# Patient Record
Sex: MALE | Race: WHITE
[De-identification: names, ages, dates, MRNs, and addresses within clinical notes are randomized per-mention and may not be internally consistent; named-entity substitution may affect disease eponyms.]

---

## 2009-02-16 VITALS — SYSTOLIC BLOOD PRESSURE: 182.9 MMHG

## 2022-06-21 ENCOUNTER — HOSPITAL ENCOUNTER (INPATIENT)
Dept: HOSPITAL 19 - IMCU | Age: 72
LOS: 9 days | Discharge: HOME HEALTH | DRG: 207 | End: 2022-06-30
Attending: INTERNAL MEDICINE | Admitting: INTERNAL MEDICINE
Payer: MEDICARE

## 2022-06-21 VITALS — OXYGEN SATURATION: 97 %

## 2022-06-21 VITALS — OXYGEN SATURATION: 96 %

## 2022-06-21 VITALS — OXYGEN SATURATION: 98 %

## 2022-06-21 VITALS — OXYGEN SATURATION: 94 %

## 2022-06-21 VITALS — OXYGEN SATURATION: 99 %

## 2022-06-21 VITALS — WEIGHT: 184.75 LBS | HEIGHT: 72.01 IN | BODY MASS INDEX: 25.02 KG/M2

## 2022-06-21 VITALS — OXYGEN SATURATION: 93 %

## 2022-06-21 VITALS — OXYGEN SATURATION: 95 %

## 2022-06-21 DIAGNOSIS — R13.10: ICD-10-CM

## 2022-06-21 DIAGNOSIS — G20: ICD-10-CM

## 2022-06-21 DIAGNOSIS — F02.80: ICD-10-CM

## 2022-06-21 DIAGNOSIS — J96.21: Primary | ICD-10-CM

## 2022-06-21 DIAGNOSIS — F17.210: ICD-10-CM

## 2022-06-21 DIAGNOSIS — G89.29: ICD-10-CM

## 2022-06-21 DIAGNOSIS — R00.0: ICD-10-CM

## 2022-06-21 DIAGNOSIS — J18.9: ICD-10-CM

## 2022-06-21 DIAGNOSIS — I10: ICD-10-CM

## 2022-06-21 DIAGNOSIS — G93.40: ICD-10-CM

## 2022-06-21 DIAGNOSIS — J96.22: ICD-10-CM

## 2022-06-21 DIAGNOSIS — K21.9: ICD-10-CM

## 2022-06-21 DIAGNOSIS — J90: ICD-10-CM

## 2022-06-21 DIAGNOSIS — Z99.81: ICD-10-CM

## 2022-06-21 DIAGNOSIS — J44.9: ICD-10-CM

## 2022-06-21 DIAGNOSIS — R53.81: ICD-10-CM

## 2022-06-21 DIAGNOSIS — Z79.891: ICD-10-CM

## 2022-06-21 DIAGNOSIS — K44.9: ICD-10-CM

## 2022-06-21 DIAGNOSIS — Z88.6: ICD-10-CM

## 2022-06-21 DIAGNOSIS — E87.2: ICD-10-CM

## 2022-06-21 DIAGNOSIS — Z98.1: ICD-10-CM

## 2022-06-21 DIAGNOSIS — I08.1: ICD-10-CM

## 2022-06-21 LAB
BASE EXCESS BLDA CALC-SCNC: 2.2 MMOL/L (ref -2–2)
CO2 BLDA-SCNC: 31 MMOL/L
HCO3 BLDA-SCNC: 29.2 MEQ/L (ref 22–26)
INHALED O2 CONCENTRATION: 40 %
PCO2 BLDA: 57.7 MMHG (ref 35–45)
PO2 BLDA: 80 MMHG (ref 80–100)
SAO2 % BLDA: 95.6 % (ref 92–100)

## 2022-06-21 PROCEDURE — 02HV33Z INSERTION OF INFUSION DEVICE INTO SUPERIOR VENA CAVA, PERCUTANEOUS APPROACH: ICD-10-PCS | Performed by: EMERGENCY MEDICINE

## 2022-06-21 PROCEDURE — C1751 CATH, INF, PER/CENT/MIDLINE: HCPCS

## 2022-06-21 PROCEDURE — 5A1955Z RESPIRATORY VENTILATION, GREATER THAN 96 CONSECUTIVE HOURS: ICD-10-PCS | Performed by: RADIOLOGY

## 2022-06-22 VITALS — OXYGEN SATURATION: 90 %

## 2022-06-22 VITALS — OXYGEN SATURATION: 93 %

## 2022-06-22 VITALS — OXYGEN SATURATION: 90 % | SYSTOLIC BLOOD PRESSURE: 98 MMHG | DIASTOLIC BLOOD PRESSURE: 59 MMHG

## 2022-06-22 VITALS — OXYGEN SATURATION: 97 %

## 2022-06-22 VITALS — OXYGEN SATURATION: 96 %

## 2022-06-22 VITALS — OXYGEN SATURATION: 94 %

## 2022-06-22 VITALS — DIASTOLIC BLOOD PRESSURE: 71 MMHG | OXYGEN SATURATION: 96 % | SYSTOLIC BLOOD PRESSURE: 121 MMHG

## 2022-06-22 VITALS — OXYGEN SATURATION: 89 %

## 2022-06-22 VITALS — OXYGEN SATURATION: 92 %

## 2022-06-22 VITALS — OXYGEN SATURATION: 95 %

## 2022-06-22 VITALS — OXYGEN SATURATION: 91 %

## 2022-06-22 VITALS
OXYGEN SATURATION: 95 % | DIASTOLIC BLOOD PRESSURE: 73 MMHG | SYSTOLIC BLOOD PRESSURE: 117 MMHG | TEMPERATURE: 96.4 F | HEART RATE: 58 BPM

## 2022-06-22 VITALS — OXYGEN SATURATION: 98 %

## 2022-06-22 VITALS — SYSTOLIC BLOOD PRESSURE: 126 MMHG | OXYGEN SATURATION: 95 % | DIASTOLIC BLOOD PRESSURE: 72 MMHG

## 2022-06-22 VITALS
HEART RATE: 91 BPM | OXYGEN SATURATION: 91 % | SYSTOLIC BLOOD PRESSURE: 93 MMHG | DIASTOLIC BLOOD PRESSURE: 60 MMHG | TEMPERATURE: 99.1 F

## 2022-06-22 VITALS
TEMPERATURE: 99.1 F | HEART RATE: 70 BPM | DIASTOLIC BLOOD PRESSURE: 61 MMHG | SYSTOLIC BLOOD PRESSURE: 105 MMHG | OXYGEN SATURATION: 92 %

## 2022-06-22 VITALS — OXYGEN SATURATION: 99 %

## 2022-06-22 VITALS — OXYGEN SATURATION: 96 % | HEART RATE: 63 BPM | SYSTOLIC BLOOD PRESSURE: 122 MMHG | DIASTOLIC BLOOD PRESSURE: 68 MMHG

## 2022-06-22 VITALS — OXYGEN SATURATION: 87 %

## 2022-06-22 VITALS — HEART RATE: 65 BPM | TEMPERATURE: 97.3 F | SYSTOLIC BLOOD PRESSURE: 146 MMHG | DIASTOLIC BLOOD PRESSURE: 86 MMHG

## 2022-06-22 VITALS — SYSTOLIC BLOOD PRESSURE: 125 MMHG | DIASTOLIC BLOOD PRESSURE: 74 MMHG | OXYGEN SATURATION: 97 %

## 2022-06-22 VITALS — OXYGEN SATURATION: 88 %

## 2022-06-22 VITALS
TEMPERATURE: 98.7 F | SYSTOLIC BLOOD PRESSURE: 96 MMHG | DIASTOLIC BLOOD PRESSURE: 57 MMHG | OXYGEN SATURATION: 95 % | HEART RATE: 75 BPM

## 2022-06-22 VITALS — DIASTOLIC BLOOD PRESSURE: 51 MMHG | SYSTOLIC BLOOD PRESSURE: 82 MMHG | OXYGEN SATURATION: 91 %

## 2022-06-22 VITALS — OXYGEN SATURATION: 85 %

## 2022-06-22 LAB
ALBUMIN SERPL-MCNC: 2.4 GM/DL (ref 3.4–4.8)
ALP SERPL-CCNC: 66 U/L (ref 40–150)
ALT SERPL-CCNC: 6 U/L (ref 0–55)
ANION GAP SERPL CALC-SCNC: 10 MMOL/L (ref 7–16)
APPEARANCE PLR: (no result)
AST SERPL-CCNC: 12 U/L (ref 5–34)
BASE EXCESS BLDA CALC-SCNC: 2.9 MMOL/L (ref -2–2)
BASE EXCESS BLDA CALC-SCNC: 5 MMOL/L (ref -2–2)
BASOPHILS # BLD: 0.1 K/MM3 (ref 0–0.2)
BASOPHILS NFR BLD AUTO: 0.7 % (ref 0–2)
BILIRUB SERPL-MCNC: 0.6 MG/DL (ref 0.2–1.2)
BUN SERPL-MCNC: 15 MG/DL (ref 8–26)
CALCIUM SERPL-MCNC: 8.1 MG/DL (ref 8.4–10.2)
CHLORIDE SERPL-SCNC: 102 MMOL/L (ref 98–107)
CO2 BLDA-SCNC: 29.7 MMOL/L
CO2 BLDA-SCNC: 29.8 MMOL/L
CO2 SERPL-SCNC: 30 MMOL/L (ref 23–31)
COLOR PLR: YELLOW
CREAT SERPL-SCNC: 0.96 MG/DL (ref 0.72–1.25)
EOSINOPHIL # BLD: 0.3 K/MM3 (ref 0–0.7)
EOSINOPHIL NFR BLD: 3.9 % (ref 0–4)
ERYTHROCYTE [DISTWIDTH] IN BLOOD BY AUTOMATED COUNT: 20.2 % (ref 11.5–14.5)
GLUCOSE SERPL-MCNC: 96 MG/DL (ref 70–99)
GRANULOCYTES # BLD AUTO: 71.5 % (ref 42.2–75.2)
HCO3 BLDA-SCNC: 28.4 MEQ/L (ref 22–26)
HCO3 BLDA-SCNC: 28.6 MEQ/L (ref 22–26)
HCT VFR BLD AUTO: 34.3 % (ref 42–52)
HGB BLD-MCNC: 9.8 G/DL (ref 13.5–18)
INHALED O2 CONCENTRATION: 40 %
INHALED O2 CONCENTRATION: 40 %
LYMPHOCYTES # BLD: 1 K/MM3 (ref 1.2–3.4)
LYMPHOCYTES NFR BLD: 13.5 % (ref 20–51)
MAGNESIUM SERPL-MCNC: 1.7 MG/DL (ref 1.6–2.6)
MCH RBC QN AUTO: 22 PG (ref 27–31)
MCHC RBC AUTO-ENTMCNC: 29 G/DL (ref 33–37)
MCV RBC AUTO: 77 FL (ref 80–100)
MONOCYTES # BLD: 0.8 K/MM3 (ref 0.1–0.6)
MONOCYTES NFR BLD AUTO: 10.3 % (ref 1.7–9.3)
MONONUC CELLS # PLR: 89.8 % (ref 0–75)
NEUTROPHILS # BLD: 5.3 K/MM3 (ref 1.4–6.5)
PCO2 BLDA: 38.1 MMHG (ref 35–45)
PCO2 BLDA: 47.4 MMHG (ref 35–45)
PLATELET # BLD AUTO: 222 K/MM3 (ref 130–400)
PMV BLD AUTO: 10.5 FL (ref 7.4–10.4)
PO2 BLDA: 75.3 MMHG (ref 80–100)
PO2 BLDA: 80.1 MMHG (ref 80–100)
POTASSIUM SERPL-SCNC: 3.9 MMOL/L (ref 3.5–4.5)
PROT SERPL-MCNC: 5.9 GM/DL (ref 6.2–8.1)
RBC # BLD AUTO: 4.48 M/MM3 (ref 4.2–5.6)
RBC # PLR: 2000 /MM3 (ref 0–0)
SAO2 % BLDA: 95.4 % (ref 92–100)
SAO2 % BLDA: 96 % (ref 92–100)
SODIUM SERPL-SCNC: 142 MMOL/L (ref 136–145)
WBC # PLR: 1826 /MM3

## 2022-06-22 PROCEDURE — 0W993ZX DRAINAGE OF RIGHT PLEURAL CAVITY, PERCUTANEOUS APPROACH, DIAGNOSTIC: ICD-10-PCS | Performed by: RADIOLOGY

## 2022-06-22 NOTE — NUR
THIS NURSE ASSESSED OG PLACEMENT DURING 1600 ASSESSEMENT. UPON ASSESSMENT IT
WAS FOUND THAT OG WAS AT 40CM AT TEETH; SHOULD BE AT 65CM. THIS NURSE
ATTEMPTED TO ADVANCE OG TO 65 BUT WAS UNABLE TO GET TUBE TO ADVANCE. OG TUBE
WAS REMOVED. THIS NURSE ATTEMPTED TO PLACE NEW OG 2X BUT WAS UNABLE TO ADVANCE
WITHOUT TUBE COILING. THIS NURSE THEN ATTEMPTED TO PLACE NG X2 BUT WAS
UNSUCCESSFULL.

## 2022-06-22 NOTE — NUR
contacted [facility] and confirmed that they
received the referral to admit patient from [facility] as they were
unable to manage patient's care.  [facility] nurse, Amy stated they
admitted patient to service on 6/4 and saw patient on 6/7,6/10 and 6/15.
Amy stated son had made an appointment for 6/22 to stop services.  Amy
stated they were not in patient's home this week and did not advise on any
care of patient this week.  Worker met [facility] patient's nurse and advised of the
above information.

## 2022-06-22 NOTE — NUR
71 yo male admitted for further care and management of acute respiratory
failure and possible sepsis with loculated pleural effusions by report.
ht 182.88 cm  wt 89.5 kg  SCr 1.05 (from [place]) with estimated CrCl 60
ml/min  half life 15.7 hours
 
Plan:
Patient received an initial loading dose of vancomycin 1000 mg x1 in [facility] prior to transport; will give a supplemental loading dose of vancomycin
750 mg for a total loading dose of 1750 mg (19.6 mg/kg); will follow with a
maintenance regimen of vancomycin 1000 mg q12h to target a goal trough of
15-20 mcg/ml.
Will follow patien't renal function, micro data, and vancomycin levels as
indicated to assess for any necessary changes to regimen.n
Thank you for this dosing consult.

## 2022-06-22 NOTE — NUR
BEDSIDE REPORT RECEIVED FROM DELORIS ROBERTS. PT INTUBATED AND SEDATED AT THIS
TIME. CURRENT VENT SETTINGS; 8.0 ETT 24 CM AT TEETH, AC MODE, , PEEP5,
FIO2 40%, AND RATE 18. RIJ TLC IN PLACE; SEE GTT FLOW SHEET FOR RATES AND
INFUSIONS. BSW IN PLACE. FC TO DEPENDENT DRAINAGE.

## 2022-06-23 VITALS — OXYGEN SATURATION: 92 %

## 2022-06-23 VITALS — OXYGEN SATURATION: 91 %

## 2022-06-23 VITALS — OXYGEN SATURATION: 95 %

## 2022-06-23 VITALS — OXYGEN SATURATION: 94 %

## 2022-06-23 VITALS — OXYGEN SATURATION: 100 %

## 2022-06-23 VITALS
SYSTOLIC BLOOD PRESSURE: 95 MMHG | OXYGEN SATURATION: 92 % | HEART RATE: 73 BPM | DIASTOLIC BLOOD PRESSURE: 60 MMHG | TEMPERATURE: 98.2 F

## 2022-06-23 VITALS — OXYGEN SATURATION: 93 %

## 2022-06-23 VITALS — SYSTOLIC BLOOD PRESSURE: 147 MMHG | OXYGEN SATURATION: 92 % | DIASTOLIC BLOOD PRESSURE: 79 MMHG

## 2022-06-23 VITALS — OXYGEN SATURATION: 96 %

## 2022-06-23 VITALS
SYSTOLIC BLOOD PRESSURE: 111 MMHG | HEART RATE: 63 BPM | OXYGEN SATURATION: 96 % | DIASTOLIC BLOOD PRESSURE: 63 MMHG | TEMPERATURE: 97.8 F

## 2022-06-23 VITALS
OXYGEN SATURATION: 93 % | SYSTOLIC BLOOD PRESSURE: 107 MMHG | DIASTOLIC BLOOD PRESSURE: 56 MMHG | HEART RATE: 83 BPM | TEMPERATURE: 98.8 F

## 2022-06-23 VITALS — OXYGEN SATURATION: 90 %

## 2022-06-23 VITALS — OXYGEN SATURATION: 97 %

## 2022-06-23 VITALS — OXYGEN SATURATION: 98 %

## 2022-06-23 VITALS — SYSTOLIC BLOOD PRESSURE: 133 MMHG | OXYGEN SATURATION: 93 % | DIASTOLIC BLOOD PRESSURE: 73 MMHG

## 2022-06-23 VITALS — OXYGEN SATURATION: 99 %

## 2022-06-23 VITALS
DIASTOLIC BLOOD PRESSURE: 61 MMHG | OXYGEN SATURATION: 95 % | HEART RATE: 61 BPM | SYSTOLIC BLOOD PRESSURE: 101 MMHG | TEMPERATURE: 97.7 F

## 2022-06-23 VITALS — OXYGEN SATURATION: 89 %

## 2022-06-23 VITALS
DIASTOLIC BLOOD PRESSURE: 75 MMHG | SYSTOLIC BLOOD PRESSURE: 136 MMHG | HEART RATE: 95 BPM | OXYGEN SATURATION: 92 % | TEMPERATURE: 98.3 F

## 2022-06-23 VITALS
HEART RATE: 77 BPM | SYSTOLIC BLOOD PRESSURE: 97 MMHG | OXYGEN SATURATION: 92 % | TEMPERATURE: 98.6 F | DIASTOLIC BLOOD PRESSURE: 60 MMHG

## 2022-06-23 VITALS — OXYGEN SATURATION: 86 %

## 2022-06-23 VITALS — OXYGEN SATURATION: 87 %

## 2022-06-23 VITALS — OXYGEN SATURATION: 81 %

## 2022-06-23 LAB
ANION GAP SERPL CALC-SCNC: 8 MMOL/L (ref 7–16)
BASE EXCESS BLDA CALC-SCNC: 1.3 MMOL/L (ref -2–2)
BASOPHILS # BLD: 0 K/MM3 (ref 0–0.2)
BASOPHILS NFR BLD AUTO: 0.3 % (ref 0–2)
BUN SERPL-MCNC: 16 MG/DL (ref 8–26)
CALCIUM SERPL-MCNC: 7.9 MG/DL (ref 8.4–10.2)
CHLORIDE SERPL-SCNC: 103 MMOL/L (ref 98–107)
CO2 BLDA-SCNC: 27.5 MMOL/L
CO2 SERPL-SCNC: 30 MMOL/L (ref 23–31)
CREAT SERPL-SCNC: 0.9 MG/DL (ref 0.72–1.25)
EOSINOPHIL # BLD: 0.3 K/MM3 (ref 0–0.7)
EOSINOPHIL NFR BLD: 4.1 % (ref 0–4)
ERYTHROCYTE [DISTWIDTH] IN BLOOD BY AUTOMATED COUNT: 20.6 % (ref 11.5–14.5)
GLUCOSE SERPL-MCNC: 80 MG/DL (ref 70–99)
GRANULOCYTES # BLD AUTO: 77.5 % (ref 42.2–75.2)
HCO3 BLDA-SCNC: 26.2 MEQ/L (ref 22–26)
HCT VFR BLD AUTO: 34.6 % (ref 42–52)
HGB BLD-MCNC: 10 G/DL (ref 13.5–18)
INHALED O2 CONCENTRATION: 40 %
LYMPHOCYTES # BLD: 0.5 K/MM3 (ref 1.2–3.4)
LYMPHOCYTES NFR BLD: 6.8 % (ref 20–51)
MAGNESIUM SERPL-MCNC: 2 MG/DL (ref 1.6–2.6)
MCH RBC QN AUTO: 22 PG (ref 27–31)
MCHC RBC AUTO-ENTMCNC: 29 G/DL (ref 33–37)
MCV RBC AUTO: 75 FL (ref 80–100)
MONOCYTES # BLD: 0.8 K/MM3 (ref 0.1–0.6)
MONOCYTES NFR BLD AUTO: 11 % (ref 1.7–9.3)
NEUTROPHILS # BLD: 5.3 K/MM3 (ref 1.4–6.5)
PCO2 BLDA: 42.5 MMHG (ref 35–45)
PLATELET # BLD AUTO: 203 K/MM3 (ref 130–400)
PMV BLD AUTO: 10.5 FL (ref 7.4–10.4)
PO2 BLDA: 69.6 MMHG (ref 80–100)
POTASSIUM SERPL-SCNC: 4.2 MMOL/L (ref 3.5–4.5)
RBC # BLD AUTO: 4.59 M/MM3 (ref 4.2–5.6)
SAO2 % BLDA: 93.2 % (ref 92–100)
SODIUM SERPL-SCNC: 141 MMOL/L (ref 136–145)
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
## 2022-06-23 NOTE — NUR
SEDATION VACATION NOT INDICATED AT THIS TIME. PT REPOSITION IN BED AND
ATTEMPTS TO SIT UP, PULLING AT TUBES, AND COUGHING ON ETT.

## 2022-06-23 NOTE — NUR
BEDSIDE REPORT RECEIVED FROM DELORIS ROBERTS. PT REMAINS INTUBATED AND SEDATED;
APPEARS TO BE RESTING COMFORTABLY AT THIS TIME. FOLLOWS COMMANDS. VENT
SETTINGS; 8.0 ETT 24CM AT LIP, AC MODE, , PEEP 5, FIO2 45%, AND RATE 18.
OG 55CM AT LIP; CLAMPED AT THIS TIME AND WAITING FOR XRAY CONFIRMATION OF
PLACEMENT TO START TUBE FEEDS. FC TO DEPENDENT DRAINAGE. BSW RESTRAINTS IN
PLACE.

## 2022-06-24 VITALS — OXYGEN SATURATION: 99 %

## 2022-06-24 VITALS — OXYGEN SATURATION: 89 %

## 2022-06-24 VITALS — OXYGEN SATURATION: 98 %

## 2022-06-24 VITALS — OXYGEN SATURATION: 96 %

## 2022-06-24 VITALS — OXYGEN SATURATION: 95 %

## 2022-06-24 VITALS — OXYGEN SATURATION: 92 %

## 2022-06-24 VITALS — OXYGEN SATURATION: 97 %

## 2022-06-24 VITALS — OXYGEN SATURATION: 93 %

## 2022-06-24 VITALS — OXYGEN SATURATION: 94 %

## 2022-06-24 VITALS — OXYGEN SATURATION: 100 %

## 2022-06-24 VITALS — OXYGEN SATURATION: 88 %

## 2022-06-24 VITALS — DIASTOLIC BLOOD PRESSURE: 45 MMHG | OXYGEN SATURATION: 99 % | SYSTOLIC BLOOD PRESSURE: 70 MMHG

## 2022-06-24 VITALS — OXYGEN SATURATION: 91 %

## 2022-06-24 VITALS — OXYGEN SATURATION: 93 % | DIASTOLIC BLOOD PRESSURE: 48 MMHG | SYSTOLIC BLOOD PRESSURE: 85 MMHG

## 2022-06-24 VITALS — DIASTOLIC BLOOD PRESSURE: 88 MMHG | SYSTOLIC BLOOD PRESSURE: 155 MMHG | OXYGEN SATURATION: 93 %

## 2022-06-24 VITALS — OXYGEN SATURATION: 98 % | DIASTOLIC BLOOD PRESSURE: 68 MMHG | SYSTOLIC BLOOD PRESSURE: 131 MMHG

## 2022-06-24 VITALS
TEMPERATURE: 98.5 F | DIASTOLIC BLOOD PRESSURE: 67 MMHG | OXYGEN SATURATION: 94 % | SYSTOLIC BLOOD PRESSURE: 113 MMHG | HEART RATE: 82 BPM

## 2022-06-24 VITALS — OXYGEN SATURATION: 97 % | SYSTOLIC BLOOD PRESSURE: 83 MMHG | DIASTOLIC BLOOD PRESSURE: 57 MMHG

## 2022-06-24 VITALS — OXYGEN SATURATION: 90 %

## 2022-06-24 VITALS — OXYGEN SATURATION: 87 %

## 2022-06-24 VITALS
TEMPERATURE: 98 F | SYSTOLIC BLOOD PRESSURE: 123 MMHG | OXYGEN SATURATION: 91 % | DIASTOLIC BLOOD PRESSURE: 67 MMHG | HEART RATE: 90 BPM

## 2022-06-24 VITALS — OXYGEN SATURATION: 84 %

## 2022-06-24 VITALS
DIASTOLIC BLOOD PRESSURE: 55 MMHG | TEMPERATURE: 98.1 F | OXYGEN SATURATION: 93 % | SYSTOLIC BLOOD PRESSURE: 93 MMHG | HEART RATE: 75 BPM

## 2022-06-24 VITALS
HEART RATE: 70 BPM | TEMPERATURE: 98.6 F | OXYGEN SATURATION: 99 % | SYSTOLIC BLOOD PRESSURE: 96 MMHG | DIASTOLIC BLOOD PRESSURE: 58 MMHG

## 2022-06-24 VITALS
SYSTOLIC BLOOD PRESSURE: 96 MMHG | DIASTOLIC BLOOD PRESSURE: 53 MMHG | OXYGEN SATURATION: 92 % | TEMPERATURE: 97.9 F | HEART RATE: 90 BPM

## 2022-06-24 VITALS
OXYGEN SATURATION: 91 % | DIASTOLIC BLOOD PRESSURE: 60 MMHG | SYSTOLIC BLOOD PRESSURE: 113 MMHG | HEART RATE: 80 BPM | TEMPERATURE: 98.2 F

## 2022-06-24 LAB
ANION GAP SERPL CALC-SCNC: 12 MMOL/L (ref 7–16)
BASE EXCESS BLDA CALC-SCNC: 1 MMOL/L (ref -2–2)
BASE EXCESS BLDA CALC-SCNC: 2.4 MMOL/L (ref -2–2)
BASOPHILS # BLD: 0 K/MM3 (ref 0–0.2)
BASOPHILS NFR BLD AUTO: 0.3 % (ref 0–2)
BUN SERPL-MCNC: 18 MG/DL (ref 8–26)
CALCIUM SERPL-MCNC: 8.1 MG/DL (ref 8.4–10.2)
CHLORIDE SERPL-SCNC: 101 MMOL/L (ref 98–107)
CO2 BLDA-SCNC: 30.3 MMOL/L
CO2 BLDA-SCNC: 31.3 MMOL/L
CO2 SERPL-SCNC: 27 MMOL/L (ref 23–31)
CREAT SERPL-SCNC: 0.98 MG/DL (ref 0.72–1.25)
EOSINOPHIL # BLD: 0.4 K/MM3 (ref 0–0.7)
EOSINOPHIL NFR BLD: 4.8 % (ref 0–4)
ERYTHROCYTE [DISTWIDTH] IN BLOOD BY AUTOMATED COUNT: 20.8 % (ref 11.5–14.5)
GLUCOSE SERPL-MCNC: 59 MG/DL (ref 70–99)
GRANULOCYTES # BLD AUTO: 77.5 % (ref 42.2–75.2)
HCO3 BLDA-SCNC: 28.7 MEQ/L (ref 22–26)
HCO3 BLDA-SCNC: 29.3 MEQ/L (ref 22–26)
HCT VFR BLD AUTO: 36.7 % (ref 42–52)
HGB BLD-MCNC: 10.5 G/DL (ref 13.5–18)
INHALED O2 CONCENTRATION: 40 %
INHALED O2 CONCENTRATION: 40 %
LYMPHOCYTES # BLD: 0.7 K/MM3 (ref 1.2–3.4)
LYMPHOCYTES NFR BLD: 7.6 % (ref 20–51)
MAGNESIUM SERPL-MCNC: 1.9 MG/DL (ref 1.6–2.6)
MCH RBC QN AUTO: 22 PG (ref 27–31)
MCHC RBC AUTO-ENTMCNC: 29 G/DL (ref 33–37)
MCV RBC AUTO: 76 FL (ref 80–100)
MONOCYTES # BLD: 0.8 K/MM3 (ref 0.1–0.6)
MONOCYTES NFR BLD AUTO: 9.6 % (ref 1.7–9.3)
NEUTROPHILS # BLD: 6.7 K/MM3 (ref 1.4–6.5)
PCO2 BLDA: 52.5 MMHG (ref 35–45)
PCO2 BLDA: 65.4 MMHG (ref 35–45)
PLATELET # BLD AUTO: 229 K/MM3 (ref 130–400)
PMV BLD AUTO: 10.1 FL (ref 7.4–10.4)
PO2 BLDA: 77.4 MMHG (ref 80–100)
PO2 BLDA: 80 MMHG (ref 80–100)
POTASSIUM SERPL-SCNC: 4.1 MMOL/L (ref 3.5–4.5)
RBC # BLD AUTO: 4.83 M/MM3 (ref 4.2–5.6)
SAO2 % BLDA: 93.4 % (ref 92–100)
SAO2 % BLDA: 93.7 % (ref 92–100)
SODIUM SERPL-SCNC: 140 MMOL/L (ref 136–145)

## 2022-06-24 PROCEDURE — 0D774DZ DILATION OF STOMACH, PYLORUS WITH INTRALUMINAL DEVICE, PERCUTANEOUS ENDOSCOPIC APPROACH: ICD-10-PCS | Performed by: PSYCHIATRY & NEUROLOGY

## 2022-06-24 NOTE — NUR
contacted Laurel Singleton, patient's PCP (ph#[phone]) to touch
base about patient. Laurel advised she was patient's PCP for many years, but
then he switched to another physician when she moved from the clinic to [facility]. Laurel advised she only recently started seeing patient again and
has only had a couple telehealth visits with patient. Laurel stated she only
knew patient's late wife and does not know the patient's son well.

## 2022-06-24 NOTE — NUR
RECEIVED BEDSIDE REPORT FROM DELORIS CEDENO. ALL LINE AND TUBE PLACEMENTS CHECKED
AND VERIFIED. PT IS SEDATED AND RESTING IN BED.

## 2022-06-24 NOTE — NUR
14F OG TUBE INSERTED BY DR. WOODS. PLACEMENT VERIFIED BY AUSCULTATION.
OG TUBE AT 68CM AT LIP. TUBE FEEDINGS INITIATED AT 15ML/HR.

## 2022-06-24 NOTE — NUR
DURING WEANING TRIAL EARLIER TODAY PT DID OPEN EYES AND FOLLOW COMMANDS BUT
WAS NOT ABLE TO BE EXTUBATED. SEDATION VACATION NOT APPROPRIATE AT THIS TIME.

## 2022-06-24 NOTE — NUR
SEDATION VACATION NOT INITIATED THIS AM AS PT BECAME RESTLESS, MORE ALERT AT
0445 AT CURRENT SEDATION SETTINGS. DRIPS INCREASED BY ONE TITRATION.

## 2022-06-25 VITALS — OXYGEN SATURATION: 93 %

## 2022-06-25 VITALS — OXYGEN SATURATION: 94 %

## 2022-06-25 VITALS — OXYGEN SATURATION: 92 %

## 2022-06-25 VITALS — OXYGEN SATURATION: 98 %

## 2022-06-25 VITALS — OXYGEN SATURATION: 91 %

## 2022-06-25 VITALS — OXYGEN SATURATION: 97 %

## 2022-06-25 VITALS — OXYGEN SATURATION: 96 %

## 2022-06-25 VITALS — OXYGEN SATURATION: 82 %

## 2022-06-25 VITALS — OXYGEN SATURATION: 99 %

## 2022-06-25 VITALS — OXYGEN SATURATION: 95 %

## 2022-06-25 VITALS
TEMPERATURE: 98.6 F | OXYGEN SATURATION: 94 % | SYSTOLIC BLOOD PRESSURE: 140 MMHG | DIASTOLIC BLOOD PRESSURE: 81 MMHG | HEART RATE: 96 BPM

## 2022-06-25 VITALS
TEMPERATURE: 97.4 F | OXYGEN SATURATION: 95 % | SYSTOLIC BLOOD PRESSURE: 97 MMHG | DIASTOLIC BLOOD PRESSURE: 59 MMHG | HEART RATE: 86 BPM

## 2022-06-25 VITALS — DIASTOLIC BLOOD PRESSURE: 70 MMHG | SYSTOLIC BLOOD PRESSURE: 128 MMHG | OXYGEN SATURATION: 96 %

## 2022-06-25 VITALS — OXYGEN SATURATION: 100 %

## 2022-06-25 VITALS
OXYGEN SATURATION: 94 % | TEMPERATURE: 97.7 F | DIASTOLIC BLOOD PRESSURE: 61 MMHG | SYSTOLIC BLOOD PRESSURE: 107 MMHG | HEART RATE: 86 BPM

## 2022-06-25 VITALS — OXYGEN SATURATION: 90 %

## 2022-06-25 VITALS — OXYGEN SATURATION: 80 %

## 2022-06-25 VITALS — OXYGEN SATURATION: 86 %

## 2022-06-25 VITALS — HEART RATE: 74 BPM | SYSTOLIC BLOOD PRESSURE: 118 MMHG | DIASTOLIC BLOOD PRESSURE: 66 MMHG | TEMPERATURE: 98 F

## 2022-06-25 VITALS — OXYGEN SATURATION: 87 %

## 2022-06-25 VITALS
OXYGEN SATURATION: 95 % | HEART RATE: 80 BPM | TEMPERATURE: 98.3 F | SYSTOLIC BLOOD PRESSURE: 94 MMHG | DIASTOLIC BLOOD PRESSURE: 59 MMHG

## 2022-06-25 VITALS — OXYGEN SATURATION: 88 %

## 2022-06-25 VITALS — OXYGEN SATURATION: 84 %

## 2022-06-25 VITALS — OXYGEN SATURATION: 89 %

## 2022-06-25 LAB
ANION GAP SERPL CALC-SCNC: 9 MMOL/L (ref 7–16)
ANISOCYTOSIS BLD QL: (no result)
BASE EXCESS BLDA CALC-SCNC: 2.5 MMOL/L (ref -2–2)
BASE EXCESS BLDA CALC-SCNC: 4.3 MMOL/L (ref -2–2)
BUN SERPL-MCNC: 18 MG/DL (ref 8–26)
CALCIUM SERPL-MCNC: 8 MG/DL (ref 8.4–10.2)
CHLORIDE SERPL-SCNC: 102 MMOL/L (ref 98–107)
CO2 BLDA-SCNC: 28.2 MMOL/L
CO2 BLDA-SCNC: 35.3 MMOL/L
CO2 SERPL-SCNC: 29 MMOL/L (ref 23–31)
CREAT SERPL-SCNC: 0.88 MG/DL (ref 0.72–1.25)
EOSINOPHIL NFR BLD: 4 % (ref 0–4)
ERYTHROCYTE [DISTWIDTH] IN BLOOD BY AUTOMATED COUNT: 20.4 % (ref 11.5–14.5)
GLUCOSE SERPL-MCNC: 92 MG/DL (ref 70–99)
HCO3 BLDA-SCNC: 26.9 MEQ/L (ref 22–26)
HCO3 BLDA-SCNC: 33 MEQ/L (ref 22–26)
HCT VFR BLD AUTO: 33.8 % (ref 42–52)
HGB BLD-MCNC: 9.8 G/DL (ref 13.5–18)
HYPOCHROMIA BLD QL SMEAR: (no result)
INHALED O2 CONCENTRATION: 45 %
INHALED O2 CONCENTRATION: 45 %
LYMPHOCYTES NFR BLD MANUAL: 7 % (ref 20–51)
MAGNESIUM SERPL-MCNC: 1.9 MG/DL (ref 1.6–2.6)
MCH RBC QN AUTO: 22 PG (ref 27–31)
MCHC RBC AUTO-ENTMCNC: 29 G/DL (ref 33–37)
MCV RBC AUTO: 75 FL (ref 80–100)
MICROCYTES BLD QL SMEAR: (no result)
MONOCYTES NFR BLD: 5 % (ref 1.7–9.3)
NEUTS SEG NFR BLD MANUAL: 84 % (ref 42–75.2)
PCO2 BLDA: 40.8 MMHG (ref 35–45)
PCO2 BLDA: 74.3 MMHG (ref 35–45)
PLATELET # BLD AUTO: 200 K/MM3 (ref 130–400)
PLATELET BLD QL SMEAR: NORMAL
PMV BLD AUTO: 10.2 FL (ref 7.4–10.4)
PO2 BLDA: 64.3 MMHG (ref 80–100)
PO2 BLDA: 66.3 MMHG (ref 80–100)
POTASSIUM SERPL-SCNC: 3.9 MMOL/L (ref 3.5–4.5)
RBC # BLD AUTO: 4.51 M/MM3 (ref 4.2–5.6)
SAO2 % BLDA: 88.2 % (ref 92–100)
SAO2 % BLDA: 93.2 % (ref 92–100)
SODIUM SERPL-SCNC: 140 MMOL/L (ref 136–145)
TRIGL SERPL-MCNC: 91 MG/DL (ref 0–149)

## 2022-06-25 NOTE — NUR
Patient resting quietly in bed. Remains on ventilator, tolerating well. All
vitals within normal limits. Receiving propfol drip, see IV drip titrations.
Patient opening eyes to speech and following verbal commands. Patient's son
updated over the phone.

## 2022-06-26 VITALS — OXYGEN SATURATION: 92 %

## 2022-06-26 VITALS — OXYGEN SATURATION: 98 %

## 2022-06-26 VITALS — OXYGEN SATURATION: 96 %

## 2022-06-26 VITALS — OXYGEN SATURATION: 97 %

## 2022-06-26 VITALS — OXYGEN SATURATION: 91 %

## 2022-06-26 VITALS — OXYGEN SATURATION: 99 %

## 2022-06-26 VITALS — OXYGEN SATURATION: 100 %

## 2022-06-26 VITALS — OXYGEN SATURATION: 94 %

## 2022-06-26 VITALS — OXYGEN SATURATION: 90 %

## 2022-06-26 VITALS — DIASTOLIC BLOOD PRESSURE: 55 MMHG | HEART RATE: 80 BPM | SYSTOLIC BLOOD PRESSURE: 102 MMHG | TEMPERATURE: 98.6 F

## 2022-06-26 VITALS — OXYGEN SATURATION: 95 %

## 2022-06-26 VITALS — HEART RATE: 97 BPM | DIASTOLIC BLOOD PRESSURE: 81 MMHG | SYSTOLIC BLOOD PRESSURE: 151 MMHG | TEMPERATURE: 98 F

## 2022-06-26 VITALS — TEMPERATURE: 98.6 F | SYSTOLIC BLOOD PRESSURE: 113 MMHG | DIASTOLIC BLOOD PRESSURE: 62 MMHG | HEART RATE: 83 BPM

## 2022-06-26 VITALS — OXYGEN SATURATION: 93 %

## 2022-06-26 VITALS — OXYGEN SATURATION: 89 %

## 2022-06-26 VITALS
SYSTOLIC BLOOD PRESSURE: 128 MMHG | DIASTOLIC BLOOD PRESSURE: 68 MMHG | OXYGEN SATURATION: 92 % | HEART RATE: 105 BPM | TEMPERATURE: 98.8 F

## 2022-06-26 VITALS — OXYGEN SATURATION: 79 %

## 2022-06-26 VITALS
HEART RATE: 93 BPM | DIASTOLIC BLOOD PRESSURE: 70 MMHG | SYSTOLIC BLOOD PRESSURE: 115 MMHG | TEMPERATURE: 98.4 F | OXYGEN SATURATION: 99 %

## 2022-06-26 VITALS — OXYGEN SATURATION: 86 %

## 2022-06-26 VITALS — OXYGEN SATURATION: 88 %

## 2022-06-26 VITALS — OXYGEN SATURATION: 82 %

## 2022-06-26 VITALS — OXYGEN SATURATION: 87 %

## 2022-06-26 VITALS — OXYGEN SATURATION: 80 %

## 2022-06-26 LAB
ANION GAP SERPL CALC-SCNC: 9 MMOL/L (ref 7–16)
BASE EXCESS BLDA CALC-SCNC: 0.9 MMOL/L (ref -2–2)
BASE EXCESS BLDA CALC-SCNC: 1.4 MMOL/L (ref -2–2)
BASE EXCESS BLDA CALC-SCNC: 2.2 MMOL/L (ref -2–2)
BASE EXCESS BLDA CALC-SCNC: 6.2 MMOL/L (ref -2–2)
BASE EXCESS BLDA CALC-SCNC: 6.5 MMOL/L (ref -2–2)
BASE EXCESS BLDA CALC-SCNC: 7.7 MMOL/L (ref -2–2)
BASOPHILS # BLD: 0 K/MM3 (ref 0–0.2)
BASOPHILS NFR BLD AUTO: 0.4 % (ref 0–2)
BUN SERPL-MCNC: 21 MG/DL (ref 8–26)
CALCIUM SERPL-MCNC: 8.1 MG/DL (ref 8.4–10.2)
CHLORIDE SERPL-SCNC: 101 MMOL/L (ref 98–107)
CO2 BLDA-SCNC: 29.2 MMOL/L
CO2 BLDA-SCNC: 30.6 MMOL/L
CO2 BLDA-SCNC: 31 MMOL/L
CO2 BLDA-SCNC: 35.1 MMOL/L
CO2 BLDA-SCNC: 35.2 MMOL/L
CO2 SERPL-SCNC: 31 MMOL/L (ref 23–31)
CREAT SERPL-SCNC: 0.9 MG/DL (ref 0.72–1.25)
EOSINOPHIL # BLD: 0.4 K/MM3 (ref 0–0.7)
EOSINOPHIL NFR BLD: 6.7 % (ref 0–4)
ERYTHROCYTE [DISTWIDTH] IN BLOOD BY AUTOMATED COUNT: 20.1 % (ref 11.5–14.5)
GLUCOSE SERPL-MCNC: 106 MG/DL (ref 70–99)
GRANULOCYTES # BLD AUTO: 74.2 % (ref 42.2–75.2)
HCO3 BLDA-SCNC: 27.5 MEQ/L (ref 22–26)
HCO3 BLDA-SCNC: 28.7 MEQ/L (ref 22–26)
HCO3 BLDA-SCNC: 29.2 MEQ/L (ref 22–26)
HCO3 BLDA-SCNC: 32.9 MEQ/L (ref 22–26)
HCO3 BLDA-SCNC: 33.4 MEQ/L (ref 22–26)
HCO3 BLDA-SCNC: 33.5 MEQ/L (ref 22–26)
HCT VFR BLD AUTO: 32.1 % (ref 42–52)
HGB BLD-MCNC: 9.3 G/DL (ref 13.5–18)
INHALED O2 CONCENTRATION: 45 %
LYMPHOCYTES # BLD: 0.4 K/MM3 (ref 1.2–3.4)
LYMPHOCYTES NFR BLD: 7.7 % (ref 20–51)
MAGNESIUM SERPL-MCNC: 1.9 MG/DL (ref 1.6–2.6)
MCH RBC QN AUTO: 22 PG (ref 27–31)
MCHC RBC AUTO-ENTMCNC: 29 G/DL (ref 33–37)
MCV RBC AUTO: 75 FL (ref 80–100)
MONOCYTES # BLD: 0.6 K/MM3 (ref 0.1–0.6)
MONOCYTES NFR BLD AUTO: 10.8 % (ref 1.7–9.3)
NEUTROPHILS # BLD: 4.1 K/MM3 (ref 1.4–6.5)
PCO2 BLDA: 53.3 MMHG (ref 35–45)
PCO2 BLDA: 53.7 MMHG (ref 35–45)
PCO2 BLDA: 56.5 MMHG (ref 35–45)
PCO2 BLDA: 57 MMHG (ref 35–45)
PCO2 BLDA: 59.6 MMHG (ref 35–45)
PCO2 BLDA: 61.8 MMHG (ref 35–45)
PLATELET # BLD AUTO: 199 K/MM3 (ref 130–400)
PMV BLD AUTO: 10.5 FL (ref 7.4–10.4)
PO2 BLDA: 106.4 MMHG (ref 80–100)
PO2 BLDA: 76.1 MMHG (ref 80–100)
PO2 BLDA: 76.9 MMHG (ref 80–100)
PO2 BLDA: 86.2 MMHG (ref 80–100)
PO2 BLDA: 86.4 MMHG (ref 80–100)
PO2 BLDA: 92.8 MMHG (ref 80–100)
POTASSIUM SERPL-SCNC: 3.8 MMOL/L (ref 3.5–4.5)
RBC # BLD AUTO: 4.3 M/MM3 (ref 4.2–5.6)
SAO2 % BLDA: 94 % (ref 92–100)
SAO2 % BLDA: 95.5 % (ref 92–100)
SAO2 % BLDA: 96.4 % (ref 92–100)
SAO2 % BLDA: 96.5 % (ref 92–100)
SAO2 % BLDA: 97.1 % (ref 92–100)
SAO2 % BLDA: 97.9 % (ref 92–100)
SODIUM SERPL-SCNC: 141 MMOL/L (ref 136–145)

## 2022-06-26 PROCEDURE — 5A09457 ASSISTANCE WITH RESPIRATORY VENTILATION, 24-96 CONSECUTIVE HOURS, CONTINUOUS POSITIVE AIRWAY PRESSURE: ICD-10-PCS | Performed by: INTERNAL MEDICINE

## 2022-06-26 NOTE — NUR
Patient resting quietly in bed watching TV. Wearing BiPap receiving 18/6 with
45% FiO2, tolerating well. All vitals within normal limits. Patient is alert
and oriented to person, year, and current president; following verbal
commands.
 
Patient provided ice chips during break from BiPap and progressed to sips of
water when tolerated well.

## 2022-06-26 NOTE — NUR
SHIFT REPORT RECEIVED. HEAD TO TOE ASSESSMENT DONE, ALL LINES AND TUBES
CHECKED. EDUCATION DONE WITH DARYN ABOUT INTUBATION WEANING BY THIS RN.

## 2022-06-26 NOTE — NUR
PT EXTUBATED BY RT AVILA, WITH TWO RNS BEDSIDE. PT TOLERATED WELL. CURRENTLY
ON 5 L OXY MASK SPO2 90%.

## 2022-06-26 NOTE — NUR
PT EXTUBATED TO A 5L OXYMASK PER DR MARTIN ORDERS.  DR MARTIN AT BEDSIDE FOR
PROCEDURE.  PT SUCTIONED VIA ETT AND ORALLY PRIOR TO EXTUBATION.  EXTUBATED AT
1030 WITH NO ISSUES.  TIA WELL.
SUCTIONED ORALLY AND PLACED ON 5L OXYMASK POST EXTUBTION.  BLBS EQUAL
AND SLIGHTLY DIMINISHED.  , RR 28, SPO2 91%.  PT HAS A STRONG PRODUCTIVE
COUGH.

## 2022-06-26 NOTE — NUR
END OF SHIFT ASSESMENT DONE AND SHIFT REPORT GIVEN. PT TO CONTINUE BI-PAP WITH
ABG'S TO BE DRAWN AGAIN AT 20:00. PT RESTING/SLEEPING COMFORTABLY AT END OF
SHIFT. CALLED AND UPDATED PT'S SON AFTER SHIFT CHANGE.

## 2022-06-27 VITALS — OXYGEN SATURATION: 99 %

## 2022-06-27 VITALS — OXYGEN SATURATION: 100 %

## 2022-06-27 VITALS — OXYGEN SATURATION: 98 %

## 2022-06-27 VITALS — OXYGEN SATURATION: 97 %

## 2022-06-27 VITALS — OXYGEN SATURATION: 93 %

## 2022-06-27 VITALS — OXYGEN SATURATION: 95 %

## 2022-06-27 VITALS — OXYGEN SATURATION: 94 %

## 2022-06-27 VITALS — OXYGEN SATURATION: 84 %

## 2022-06-27 VITALS
OXYGEN SATURATION: 100 % | SYSTOLIC BLOOD PRESSURE: 127 MMHG | HEART RATE: 92 BPM | DIASTOLIC BLOOD PRESSURE: 69 MMHG | TEMPERATURE: 98 F

## 2022-06-27 VITALS — OXYGEN SATURATION: 89 %

## 2022-06-27 VITALS — OXYGEN SATURATION: 96 %

## 2022-06-27 VITALS
OXYGEN SATURATION: 94 % | TEMPERATURE: 98.1 F | SYSTOLIC BLOOD PRESSURE: 134 MMHG | HEART RATE: 100 BPM | DIASTOLIC BLOOD PRESSURE: 86 MMHG

## 2022-06-27 VITALS — OXYGEN SATURATION: 92 %

## 2022-06-27 VITALS — OXYGEN SATURATION: 85 %

## 2022-06-27 VITALS — OXYGEN SATURATION: 83 %

## 2022-06-27 VITALS
SYSTOLIC BLOOD PRESSURE: 131 MMHG | DIASTOLIC BLOOD PRESSURE: 74 MMHG | TEMPERATURE: 98 F | OXYGEN SATURATION: 100 % | HEART RATE: 90 BPM

## 2022-06-27 VITALS — OXYGEN SATURATION: 86 %

## 2022-06-27 VITALS — OXYGEN SATURATION: 91 %

## 2022-06-27 VITALS — OXYGEN SATURATION: 90 %

## 2022-06-27 VITALS
DIASTOLIC BLOOD PRESSURE: 71 MMHG | HEART RATE: 92 BPM | TEMPERATURE: 99 F | OXYGEN SATURATION: 100 % | SYSTOLIC BLOOD PRESSURE: 121 MMHG

## 2022-06-27 VITALS — OXYGEN SATURATION: 80 %

## 2022-06-27 VITALS
HEART RATE: 94 BPM | TEMPERATURE: 99 F | OXYGEN SATURATION: 64 % | DIASTOLIC BLOOD PRESSURE: 76 MMHG | SYSTOLIC BLOOD PRESSURE: 141 MMHG

## 2022-06-27 VITALS — OXYGEN SATURATION: 87 %

## 2022-06-27 VITALS — OXYGEN SATURATION: 79 %

## 2022-06-27 VITALS
DIASTOLIC BLOOD PRESSURE: 68 MMHG | TEMPERATURE: 98.3 F | SYSTOLIC BLOOD PRESSURE: 133 MMHG | OXYGEN SATURATION: 97 % | HEART RATE: 89 BPM

## 2022-06-27 VITALS — OXYGEN SATURATION: 82 %

## 2022-06-27 VITALS — OXYGEN SATURATION: 75 %

## 2022-06-27 VITALS — OXYGEN SATURATION: 88 %

## 2022-06-27 VITALS — OXYGEN SATURATION: 71 %

## 2022-06-27 LAB
ALBUMIN SERPL-MCNC: 2.5 GM/DL (ref 3.4–4.8)
ALP SERPL-CCNC: 66 U/L (ref 40–150)
ALT SERPL-CCNC: 6 U/L (ref 0–55)
ANION GAP SERPL CALC-SCNC: 11 MMOL/L (ref 7–16)
AST SERPL-CCNC: 26 U/L (ref 5–34)
BASE EXCESS BLDA CALC-SCNC: 7.3 MMOL/L (ref -2–2)
BASOPHILS # BLD: 0 K/MM3 (ref 0–0.2)
BASOPHILS NFR BLD AUTO: 0.6 % (ref 0–2)
BILIRUB SERPL-MCNC: 0.9 MG/DL (ref 0.2–1.2)
BUN SERPL-MCNC: 19 MG/DL (ref 8–26)
CALCIUM SERPL-MCNC: 8.7 MG/DL (ref 8.4–10.2)
CHLORIDE SERPL-SCNC: 100 MMOL/L (ref 98–107)
CO2 BLDA-SCNC: 35.4 MMOL/L
CO2 SERPL-SCNC: 32 MMOL/L (ref 23–31)
CREAT SERPL-SCNC: 0.83 MG/DL (ref 0.72–1.25)
EOSINOPHIL # BLD: 0.3 K/MM3 (ref 0–0.7)
EOSINOPHIL NFR BLD: 4.7 % (ref 0–4)
ERYTHROCYTE [DISTWIDTH] IN BLOOD BY AUTOMATED COUNT: 20.6 % (ref 11.5–14.5)
GLUCOSE SERPL-MCNC: 69 MG/DL (ref 70–99)
GRANULOCYTES # BLD AUTO: 76.7 % (ref 42.2–75.2)
HCO3 BLDA-SCNC: 33.7 MEQ/L (ref 22–26)
HCT VFR BLD AUTO: 36.7 % (ref 42–52)
HGB BLD-MCNC: 10.4 G/DL (ref 13.5–18)
INHALED O2 CONCENTRATION: 45 %
LYMPHOCYTES # BLD: 0.6 K/MM3 (ref 1.2–3.4)
LYMPHOCYTES NFR BLD: 8 % (ref 20–51)
MAGNESIUM SERPL-MCNC: 1.8 MG/DL (ref 1.6–2.6)
MCH RBC QN AUTO: 22 PG (ref 27–31)
MCHC RBC AUTO-ENTMCNC: 28 G/DL (ref 33–37)
MCV RBC AUTO: 77 FL (ref 80–100)
MONOCYTES # BLD: 0.7 K/MM3 (ref 0.1–0.6)
MONOCYTES NFR BLD AUTO: 9.7 % (ref 1.7–9.3)
NEUTROPHILS # BLD: 5.6 K/MM3 (ref 1.4–6.5)
PCO2 BLDA: 57 MMHG (ref 35–45)
PHOSPHATE SERPL-MCNC: 3.1 MG/DL (ref 2.3–4.7)
PLATELET # BLD AUTO: 238 K/MM3 (ref 130–400)
PMV BLD AUTO: 11 FL (ref 7.4–10.4)
PO2 BLDA: 94.5 MMHG (ref 80–100)
POTASSIUM SERPL-SCNC: 3.7 MMOL/L (ref 3.5–4.5)
PROT SERPL-MCNC: 6.9 GM/DL (ref 6.2–8.1)
RBC # BLD AUTO: 4.78 M/MM3 (ref 4.2–5.6)
SAO2 % BLDA: 97.8 % (ref 92–100)
SODIUM SERPL-SCNC: 143 MMOL/L (ref 136–145)

## 2022-06-27 NOTE — NUR
Patient resting quietly in bed watching TV. Alert and oriented; all vitals
within normal limits. Receiving 4L oxygen via oxymask at this time, tolerating
well. Total bed bath performed. Denies pain or discomfort. No further needs
noted.

## 2022-06-27 NOTE — NUR
Patient alert and resting in bed.  Removed BIPAP per Dr. Ocampo and placed
initially on 4L oxymask.  02 maintaining 98% and 02 was reduced to 3L. VS
stable;  will continue to monitor.

## 2022-06-28 VITALS — OXYGEN SATURATION: 97 %

## 2022-06-28 VITALS — OXYGEN SATURATION: 96 %

## 2022-06-28 VITALS — OXYGEN SATURATION: 95 %

## 2022-06-28 VITALS — OXYGEN SATURATION: 99 %

## 2022-06-28 VITALS — OXYGEN SATURATION: 98 %

## 2022-06-28 VITALS
HEART RATE: 87 BPM | DIASTOLIC BLOOD PRESSURE: 79 MMHG | OXYGEN SATURATION: 88 % | SYSTOLIC BLOOD PRESSURE: 139 MMHG | TEMPERATURE: 97.9 F

## 2022-06-28 VITALS — OXYGEN SATURATION: 100 %

## 2022-06-28 VITALS — OXYGEN SATURATION: 89 %

## 2022-06-28 VITALS — OXYGEN SATURATION: 94 %

## 2022-06-28 VITALS — OXYGEN SATURATION: 87 %

## 2022-06-28 VITALS — OXYGEN SATURATION: 83 %

## 2022-06-28 VITALS
HEART RATE: 88 BPM | TEMPERATURE: 99 F | SYSTOLIC BLOOD PRESSURE: 122 MMHG | DIASTOLIC BLOOD PRESSURE: 73 MMHG | OXYGEN SATURATION: 99 %

## 2022-06-28 VITALS — OXYGEN SATURATION: 93 %

## 2022-06-28 VITALS — HEART RATE: 86 BPM | SYSTOLIC BLOOD PRESSURE: 102 MMHG | TEMPERATURE: 98.2 F | DIASTOLIC BLOOD PRESSURE: 58 MMHG

## 2022-06-28 VITALS — OXYGEN SATURATION: 88 %

## 2022-06-28 VITALS — OXYGEN SATURATION: 85 %

## 2022-06-28 VITALS — OXYGEN SATURATION: 92 %

## 2022-06-28 VITALS
SYSTOLIC BLOOD PRESSURE: 130 MMHG | TEMPERATURE: 97.8 F | DIASTOLIC BLOOD PRESSURE: 74 MMHG | HEART RATE: 94 BPM | OXYGEN SATURATION: 91 %

## 2022-06-28 VITALS — OXYGEN SATURATION: 80 %

## 2022-06-28 VITALS — OXYGEN SATURATION: 84 %

## 2022-06-28 VITALS — OXYGEN SATURATION: 91 %

## 2022-06-28 VITALS — OXYGEN SATURATION: 90 %

## 2022-06-28 VITALS — OXYGEN SATURATION: 86 %

## 2022-06-28 VITALS
TEMPERATURE: 97.8 F | DIASTOLIC BLOOD PRESSURE: 63 MMHG | HEART RATE: 89 BPM | SYSTOLIC BLOOD PRESSURE: 115 MMHG | OXYGEN SATURATION: 98 %

## 2022-06-28 VITALS — OXYGEN SATURATION: 72 %

## 2022-06-28 VITALS — OXYGEN SATURATION: 81 %

## 2022-06-28 VITALS
DIASTOLIC BLOOD PRESSURE: 76 MMHG | TEMPERATURE: 98.1 F | HEART RATE: 86 BPM | OXYGEN SATURATION: 93 % | SYSTOLIC BLOOD PRESSURE: 133 MMHG

## 2022-06-28 VITALS — OXYGEN SATURATION: 82 %

## 2022-06-28 VITALS — OXYGEN SATURATION: 78 %

## 2022-06-28 VITALS — OXYGEN SATURATION: 77 %

## 2022-06-28 LAB
ANION GAP SERPL CALC-SCNC: 14 MMOL/L (ref 7–16)
ANISOCYTOSIS BLD QL: (no result)
BASE EXCESS BLDA CALC-SCNC: 5.1 MMOL/L (ref -2–2)
BUN SERPL-MCNC: 23 MG/DL (ref 8–26)
CALCIUM SERPL-MCNC: 8.8 MG/DL (ref 8.4–10.2)
CHLORIDE SERPL-SCNC: 99 MMOL/L (ref 98–107)
CO2 BLDA-SCNC: 32.7 MMOL/L
CO2 SERPL-SCNC: 30 MMOL/L (ref 23–31)
CREAT SERPL-SCNC: 0.86 MG/DL (ref 0.72–1.25)
ERYTHROCYTE [DISTWIDTH] IN BLOOD BY AUTOMATED COUNT: 20.4 % (ref 11.5–14.5)
GLUCOSE SERPL-MCNC: 108 MG/DL (ref 70–99)
HCO3 BLDA-SCNC: 31.1 MEQ/L (ref 22–26)
HCT VFR BLD AUTO: 36.2 % (ref 42–52)
HGB BLD-MCNC: 10.3 G/DL (ref 13.5–18)
HYPOCHROMIA BLD QL SMEAR: (no result)
INHALED O2 CONCENTRATION: 30 %
LYMPHOCYTES NFR BLD MANUAL: 7 % (ref 20–51)
MAGNESIUM SERPL-MCNC: 1.9 MG/DL (ref 1.6–2.6)
MCH RBC QN AUTO: 22 PG (ref 27–31)
MCHC RBC AUTO-ENTMCNC: 29 G/DL (ref 33–37)
MCV RBC AUTO: 77 FL (ref 80–100)
MONOCYTES NFR BLD: 1 % (ref 1.7–9.3)
NEUTS SEG NFR BLD MANUAL: 92 % (ref 42–75.2)
PCO2 BLDA: 52.5 MMHG (ref 35–45)
PLATELET # BLD AUTO: 235 K/MM3 (ref 130–400)
PLATELET BLD QL SMEAR: NORMAL
PMV BLD AUTO: 10.8 FL (ref 7.4–10.4)
PO2 BLDA: 72.8 MMHG (ref 80–100)
POTASSIUM SERPL-SCNC: 3.8 MMOL/L (ref 3.5–4.5)
RBC # BLD AUTO: 4.72 M/MM3 (ref 4.2–5.6)
SAO2 % BLDA: 94 % (ref 92–100)
SODIUM SERPL-SCNC: 143 MMOL/L (ref 136–145)

## 2022-06-28 NOTE — NUR
followed up with patient to discuss discharge planning. SW
discussed rehab options with patient including IPR and SNF. Patient declined
and stated he is going home. SW discussed Home Health services and patient
advised he would have to think about it. SW contacted patient's son John to
provide update. John advised patient will likely continue to refuse placement
and that he is home with patient at all times. JENNIFER also contacted DONNELL Echols
 who plans to visit patient today.

## 2022-06-28 NOTE — NUR
PT TO FLOOR FROM ICU. PT ON 4LIT VIA NC. DINNER GIVEN TO PT TO EAT. PT STATES
THAT HE IS NOT HUNGRY AND DOES NOT WANT ANYTHING, "JUST A DRINK OF MILK." MILK
WAS OPENED AND SET UP FOR HIM. PT STATES NO PAIN,SOB AT THIS TIME. CALL LIGHT
IS WITHIN REACH.

## 2022-06-28 NOTE — NUR
PT BROUGHT UP TO ROOM 311 VIA WHEEL CHAIR AND ON 4L 02 NC. PT ASSISTED INTO
BED WITHOUT COMPLICATIONS. BED IN LOWEST LOCKED POSITION WITH CALL LIGHT IN
HAND. CALLED PT'S NURSE DEWAYNE RN NOTIFYING HER THAT PT IS IN ROOM. DEWAYNE
ACCEPTED PT VERBALY.

## 2022-06-29 VITALS — TEMPERATURE: 98.1 F | SYSTOLIC BLOOD PRESSURE: 136 MMHG | HEART RATE: 85 BPM | DIASTOLIC BLOOD PRESSURE: 72 MMHG

## 2022-06-29 VITALS — HEART RATE: 100 BPM | SYSTOLIC BLOOD PRESSURE: 113 MMHG | TEMPERATURE: 97.8 F | DIASTOLIC BLOOD PRESSURE: 62 MMHG

## 2022-06-29 VITALS — HEART RATE: 86 BPM | TEMPERATURE: 98.7 F | SYSTOLIC BLOOD PRESSURE: 128 MMHG | DIASTOLIC BLOOD PRESSURE: 82 MMHG

## 2022-06-29 VITALS — DIASTOLIC BLOOD PRESSURE: 65 MMHG | SYSTOLIC BLOOD PRESSURE: 126 MMHG | TEMPERATURE: 98.2 F | HEART RATE: 94 BPM

## 2022-06-29 VITALS — SYSTOLIC BLOOD PRESSURE: 107 MMHG | DIASTOLIC BLOOD PRESSURE: 62 MMHG | TEMPERATURE: 97.7 F | HEART RATE: 99 BPM

## 2022-06-29 VITALS — TEMPERATURE: 97.4 F | DIASTOLIC BLOOD PRESSURE: 74 MMHG | HEART RATE: 87 BPM | SYSTOLIC BLOOD PRESSURE: 128 MMHG

## 2022-06-29 NOTE — NUR
Pt continues to sit up in chair.  Upon entering room noticed that pt had his
hand near his TLC.  Asked him if his neck was bothering him.  Pt asked, Is
this some type of joke?"  Had him remove his hand and noticed his line was
out.  Sutures removed and site covered with gauze and tegaderm.  O2 was off as
well, sats at 79%.  Placed O2 back on and sats came up to 97% quickly.  Pt
does have chair alarm on.  Denies having any complaints of pain at this time.

## 2022-06-29 NOTE — NUR
PT HAD UNEVENTFUL NIGHT. REMAINED ON THE BIPAP UNTIL ABOUT 4AM. PT REMOVED
FROM BIPAP AND PLACED ON NASAL CANNULA, THE PATIENT DOES DESAT INITIALLY, AND
THEN STABLIZES. HE REMAINS ON 4.5L O2 VIA NC. NO OTHER CONCERNS. WILL GIVE
REPORT TO DAY SHIFT RN.

## 2022-06-29 NOTE — NUR
Pt doing okay this morning.  Layton catheter removed per order that was entered
yesterday.  Gave pt urinal to void in.  Informed him to notify nursing if he
needs help or after he voids so we can empty it.  Pt denies having any
complaints of pain.  He did eat about half of his breakfast.  He did make a
comment about not having his dentures here.  There were bottom dentures by the
sink, gave them to him at this time.  No other needs verbalized, will continue
to monitor

## 2022-06-29 NOTE — NUR
Visited with pt's son for quite a while.  He was asking questions that were
basically impossible to answer.  Asked when he would get backto baseline and
if he ever would.  Wanted to know what he was supposed to do with him when he
needed to leave the house or go to the store.  He was very upset as he is
being looked in to for possibly taking her dad's medications.  He stated that
he has a brother that is a drug addict, but that he is not.  He was wondering
why we had not been giving him any pain medications.  Explained that he has
not had any complaints to us when I have been assessing his pain.  Stated that
he has also ambulated with therapy as well as to the restroom multiple times
with staff with no complaints.  Informed son that he may have to do some
research for home devices that he feels he is needing to help with care.

## 2022-06-29 NOTE — NUR
Pt has worked with PT.  He did get out and walk in the halls, did well with no
complaints. Voiding without difficulty since having catheter removed

## 2022-06-29 NOTE — NUR
ST worked with pt on eating his lunch.  Recommend that pt can advance his diet
to more regular foods as long as meat is cut up.  Reported that he did well
eating. Pt continues to sit up in the chair with chair alarm on.  O2 at 2L per
NC.

## 2022-06-30 VITALS — HEART RATE: 81 BPM | DIASTOLIC BLOOD PRESSURE: 70 MMHG | TEMPERATURE: 97.5 F | SYSTOLIC BLOOD PRESSURE: 130 MMHG

## 2022-06-30 VITALS — DIASTOLIC BLOOD PRESSURE: 68 MMHG | SYSTOLIC BLOOD PRESSURE: 129 MMHG | HEART RATE: 88 BPM | TEMPERATURE: 97.7 F

## 2022-06-30 VITALS — HEART RATE: 78 BPM | SYSTOLIC BLOOD PRESSURE: 141 MMHG | DIASTOLIC BLOOD PRESSURE: 78 MMHG | TEMPERATURE: 97.9 F

## 2022-06-30 VITALS — SYSTOLIC BLOOD PRESSURE: 121 MMHG | HEART RATE: 80 BPM | DIASTOLIC BLOOD PRESSURE: 63 MMHG | TEMPERATURE: 98 F

## 2022-06-30 NOTE — NUR
DISCHARGE INSTRUCTIONS GIVEN, SON AT BEDSIDE, ALL QUESTIONS ANSWERED. IV D/C.
WILL ESCORT PT OFF OF UNIT WITH PERSONAL BELONGINGS. WILL D/C FROM SYSTEM.

## 2022-06-30 NOTE — NUR
PT SITTING UP IN BED. MORNING MEDICATIONS GIVEN. SHIFT ASSESSMENT COMPLETED.
PT ALERT AND ORIENTED AT THIS TIME. DENIES ANY PAIN OR NEEDS AT THIS TIME.
EAGER TO D/C HOME. MARQUIS DUMONT COMPLETED A POST VOID RISIDUAL AND FOUND APPROX.
52ML IN BLADDER. PLANS TO D/C PT TODAY. WILL CONTINUE TO MONITOR.
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
## 2022-11-07 ENCOUNTER — HOSPITAL ENCOUNTER (INPATIENT)
Dept: HOSPITAL 19 - COL.ER | Age: 72
LOS: 2 days | Discharge: HOME | DRG: 871 | End: 2022-11-09
Attending: STUDENT IN AN ORGANIZED HEALTH CARE EDUCATION/TRAINING PROGRAM | Admitting: STUDENT IN AN ORGANIZED HEALTH CARE EDUCATION/TRAINING PROGRAM
Payer: MEDICARE

## 2022-11-07 VITALS — OXYGEN SATURATION: 95 %

## 2022-11-07 VITALS — OXYGEN SATURATION: 91 %

## 2022-11-07 VITALS — OXYGEN SATURATION: 90 %

## 2022-11-07 VITALS — OXYGEN SATURATION: 89 %

## 2022-11-07 VITALS — OXYGEN SATURATION: 86 %

## 2022-11-07 VITALS — BODY MASS INDEX: 26.04 KG/M2 | WEIGHT: 181.88 LBS | HEIGHT: 70 IN

## 2022-11-07 VITALS — OXYGEN SATURATION: 93 %

## 2022-11-07 VITALS — OXYGEN SATURATION: 96 %

## 2022-11-07 VITALS — OXYGEN SATURATION: 94 %

## 2022-11-07 VITALS — OXYGEN SATURATION: 88 %

## 2022-11-07 VITALS — OXYGEN SATURATION: 98 %

## 2022-11-07 VITALS — OXYGEN SATURATION: 99 %

## 2022-11-07 VITALS — OXYGEN SATURATION: 97 %

## 2022-11-07 VITALS — OXYGEN SATURATION: 92 %

## 2022-11-07 VITALS — OXYGEN SATURATION: 100 %

## 2022-11-07 VITALS — SYSTOLIC BLOOD PRESSURE: 136 MMHG | HEART RATE: 97 BPM | TEMPERATURE: 97.6 F | DIASTOLIC BLOOD PRESSURE: 64 MMHG

## 2022-11-07 VITALS — OXYGEN SATURATION: 83 %

## 2022-11-07 VITALS — OXYGEN SATURATION: 87 %

## 2022-11-07 VITALS — OXYGEN SATURATION: 85 %

## 2022-11-07 DIAGNOSIS — M19.90: ICD-10-CM

## 2022-11-07 DIAGNOSIS — K21.9: ICD-10-CM

## 2022-11-07 DIAGNOSIS — E87.8: ICD-10-CM

## 2022-11-07 DIAGNOSIS — J96.22: ICD-10-CM

## 2022-11-07 DIAGNOSIS — I13.0: ICD-10-CM

## 2022-11-07 DIAGNOSIS — N18.9: ICD-10-CM

## 2022-11-07 DIAGNOSIS — G89.29: ICD-10-CM

## 2022-11-07 DIAGNOSIS — I50.32: ICD-10-CM

## 2022-11-07 DIAGNOSIS — F02.80: ICD-10-CM

## 2022-11-07 DIAGNOSIS — R73.9: ICD-10-CM

## 2022-11-07 DIAGNOSIS — M54.9: ICD-10-CM

## 2022-11-07 DIAGNOSIS — K44.9: ICD-10-CM

## 2022-11-07 DIAGNOSIS — G20: ICD-10-CM

## 2022-11-07 DIAGNOSIS — Z99.81: ICD-10-CM

## 2022-11-07 DIAGNOSIS — A41.9: Primary | ICD-10-CM

## 2022-11-07 DIAGNOSIS — J91.8: ICD-10-CM

## 2022-11-07 DIAGNOSIS — J96.21: ICD-10-CM

## 2022-11-07 DIAGNOSIS — F17.210: ICD-10-CM

## 2022-11-07 DIAGNOSIS — R04.2: ICD-10-CM

## 2022-11-07 DIAGNOSIS — D64.9: ICD-10-CM

## 2022-11-07 DIAGNOSIS — Z23: ICD-10-CM

## 2022-11-07 DIAGNOSIS — Z88.6: ICD-10-CM

## 2022-11-07 DIAGNOSIS — J44.9: ICD-10-CM

## 2022-11-07 DIAGNOSIS — C34.31: ICD-10-CM

## 2022-11-07 LAB
ANION GAP SERPL CALC-SCNC: 8 MMOL/L (ref 7–16)
BASE EXCESS BLDA CALC-SCNC: 12.5 MMOL/L (ref -2–2)
BASE EXCESS BLDA CALC-SCNC: 15.9 MMOL/L (ref -2–2)
BASOPHILS # BLD: 0.1 K/MM3 (ref 0–0.2)
BASOPHILS NFR BLD AUTO: 0.3 % (ref 0–2)
BUN SERPL-MCNC: 14 MG/DL (ref 8–26)
CALCIUM SERPL-MCNC: 9.8 MG/DL (ref 8.4–10.2)
CHLORIDE SERPL-SCNC: 92 MMOL/L (ref 98–107)
CO2 BLDA-SCNC: 40.5 MMOL/L
CO2 BLDA-SCNC: 47.2 MMOL/L
CO2 SERPL-SCNC: 41 MMOL/L (ref 23–31)
CREAT SERPL-SCNC: 0.87 MG/DL (ref 0.72–1.25)
EOSINOPHIL # BLD: 0.5 K/MM3 (ref 0–0.7)
EOSINOPHIL NFR BLD: 2.6 % (ref 0–4)
ERYTHROCYTE [DISTWIDTH] IN BLOOD BY AUTOMATED COUNT: 17.9 % (ref 11.5–14.5)
GLUCOSE SERPL-MCNC: 125 MG/DL (ref 70–99)
GRANULOCYTES # BLD AUTO: 87.7 % (ref 42.2–75.2)
HCO3 BLDA-SCNC: 38.6 MEQ/L (ref 22–26)
HCO3 BLDA-SCNC: 44.6 MEQ/L (ref 22–26)
HCT VFR BLD AUTO: 31.8 % (ref 42–52)
HGB BLD-MCNC: 9.1 G/DL (ref 13.5–18)
INHALED O2 CONCENTRATION: 35 %
LYMPHOCYTES # BLD: 0.9 K/MM3 (ref 1.2–3.4)
LYMPHOCYTES NFR BLD: 4.4 % (ref 20–51)
MCH RBC QN AUTO: 26 PG (ref 27–31)
MCHC RBC AUTO-ENTMCNC: 29 G/DL (ref 33–37)
MCV RBC AUTO: 89 FL (ref 80–100)
MONOCYTES # BLD: 0.8 K/MM3 (ref 0.1–0.6)
MONOCYTES NFR BLD AUTO: 4.3 % (ref 1.7–9.3)
NEUTROPHILS # BLD: 17.1 K/MM3 (ref 1.4–6.5)
PCO2 BLDA: 59.7 MMHG (ref 35–45)
PCO2 BLDA: 84.9 MMHG (ref 35–45)
PLATELET # BLD AUTO: 249 K/MM3 (ref 130–400)
PMV BLD AUTO: 10.3 FL (ref 7.4–10.4)
PO2 BLDA: 64.1 MMHG (ref 80–100)
PO2 BLDA: 68.5 MMHG (ref 80–100)
POTASSIUM SERPL-SCNC: 4.5 MMOL/L (ref 3.5–4.5)
RBC # BLD AUTO: 3.56 M/MM3 (ref 4.2–5.6)
SAO2 % BLDA: 91.3 % (ref 92–100)
SAO2 % BLDA: 94.4 % (ref 92–100)
SODIUM SERPL-SCNC: 141 MMOL/L (ref 136–145)

## 2022-11-07 PROCEDURE — 5A09457 ASSISTANCE WITH RESPIRATORY VENTILATION, 24-96 CONSECUTIVE HOURS, CONTINUOUS POSITIVE AIRWAY PRESSURE: ICD-10-PCS | Performed by: STUDENT IN AN ORGANIZED HEALTH CARE EDUCATION/TRAINING PROGRAM

## 2022-11-07 NOTE — NUR
PT'S BELONINGS ON ARRIVAL TO UNIT: 1 RING LT HAND, 1 WATCH RT WRIST, EYE
GLASSES, BRIEFS, SHIRT, SOCKS, JEANS WITH BELT, SLIPPERS, HAT, SUNGLASSES 1
PAIR, PACK OF CIGARETTES X2, JACKET, CELL PHONE (NO ), 1 BILL FOLD (NO
CASH), 1 HARD WALLET (NO CASH), SET OF KEYS X2, 1 ENVELOPE WITH $292 (COUNTED
BY PT AND THIS NURSE) PT DECLINED TO HAVE ENVELOPE OR OTHER POSSESIONS LOCKED
UP BY SECURITY.

## 2022-11-07 NOTE — NUR
PT RECEIVED FROM ED VIA CART AND ON BIPAP. PT A&O. PT WITH WEAK GAIT, REPORTS
HE USES A MOTORIZED SCOOTER AT HOME AND HOLDS ONTO FURNITURE IF AMBULATING. PT
TACHYPENIC WHEN TRANSFERING FROM ED BED TO ICU BED AND WHILE TRYING TO TALK.
PT REPORTS BEING ON 5L O2 AT HOME BASELINE. PT WITH BLE EDEMA +2 THAT PT
REPORTS IS NORMAL. PT SKIN IS GENERALIZED DRY AND FLAKY. PT WITH GENERALIZED
SCATTERED BRUISING BUE. PT IS ABLE TO VOID INTO BEDSIDE URINAL. PT ORIENTED TO
ROOM, CALL LIGHT WITHIN REACH.

## 2022-11-08 VITALS — OXYGEN SATURATION: 93 %

## 2022-11-08 VITALS — DIASTOLIC BLOOD PRESSURE: 49 MMHG | HEART RATE: 109 BPM | TEMPERATURE: 97.9 F | SYSTOLIC BLOOD PRESSURE: 115 MMHG

## 2022-11-08 VITALS — OXYGEN SATURATION: 90 %

## 2022-11-08 VITALS — OXYGEN SATURATION: 94 %

## 2022-11-08 VITALS — OXYGEN SATURATION: 96 %

## 2022-11-08 VITALS — OXYGEN SATURATION: 99 %

## 2022-11-08 VITALS — OXYGEN SATURATION: 97 %

## 2022-11-08 VITALS — OXYGEN SATURATION: 95 %

## 2022-11-08 VITALS — OXYGEN SATURATION: 91 %

## 2022-11-08 VITALS — OXYGEN SATURATION: 92 %

## 2022-11-08 VITALS — OXYGEN SATURATION: 89 %

## 2022-11-08 VITALS — OXYGEN SATURATION: 88 %

## 2022-11-08 VITALS — OXYGEN SATURATION: 85 %

## 2022-11-08 VITALS — OXYGEN SATURATION: 86 %

## 2022-11-08 VITALS — OXYGEN SATURATION: 82 %

## 2022-11-08 VITALS — TEMPERATURE: 97.5 F | DIASTOLIC BLOOD PRESSURE: 47 MMHG | HEART RATE: 93 BPM | SYSTOLIC BLOOD PRESSURE: 117 MMHG

## 2022-11-08 VITALS
HEART RATE: 78 BPM | SYSTOLIC BLOOD PRESSURE: 96 MMHG | OXYGEN SATURATION: 92 % | DIASTOLIC BLOOD PRESSURE: 54 MMHG | TEMPERATURE: 97.9 F

## 2022-11-08 VITALS — OXYGEN SATURATION: 98 %

## 2022-11-08 VITALS
TEMPERATURE: 97.8 F | DIASTOLIC BLOOD PRESSURE: 63 MMHG | HEART RATE: 94 BPM | OXYGEN SATURATION: 94 % | SYSTOLIC BLOOD PRESSURE: 114 MMHG

## 2022-11-08 VITALS — SYSTOLIC BLOOD PRESSURE: 106 MMHG | HEART RATE: 93 BPM | DIASTOLIC BLOOD PRESSURE: 64 MMHG | TEMPERATURE: 97.8 F

## 2022-11-08 VITALS — OXYGEN SATURATION: 84 %

## 2022-11-08 VITALS — OXYGEN SATURATION: 87 %

## 2022-11-08 VITALS — OXYGEN SATURATION: 83 %

## 2022-11-08 VITALS
OXYGEN SATURATION: 90 % | HEART RATE: 89 BPM | SYSTOLIC BLOOD PRESSURE: 108 MMHG | DIASTOLIC BLOOD PRESSURE: 69 MMHG | TEMPERATURE: 97.7 F

## 2022-11-08 VITALS — OXYGEN SATURATION: 100 %

## 2022-11-08 NOTE — NUR
PATIENT IN BED AT THIS TIME WATCHING TV. DENIES PAIN. WHEN GIVING IV
DOXYCYCLINE, THIS RN NOTICIED IV TO RIGHT AC LEAKING AND PATIENT COMPLAINING
OF STINGING TO THE SITE. IV DOXYCYCLINE STOPPED AND AREA CLEANSED. THIS RN
INFORMED PATIENT HE WOULD NEED A NEW IV, PER PATIENT, HE WILL NOT LET STAFF
PLACE A NEW IV UNTIL HIS SON IS HERE. SON ON HIS WAY AT THIS TIME, WILL UPDATE
NIGHT SHIFT RN THAT PATIENT NEEDS NEW IV ACCESS AND HIS DOXYCYCLINE. PATIENT
DENIES OTHER NEEDS AT THIS TIME, CALL LIGHT WITHIN REACH.

## 2022-11-08 NOTE — NUR
PATIENT IS LAYING IN BED AT THIS TIME WITH CALL LIGHT IN LAP.  PATIENT STATES
HE NEEDS HIS IV REPLACED AND I'M NOT TO TOUCH HIS LEFT ARM AS IT'S HIS
DOMINANT HAND.  WHEN LOOKING AT RIGHT ARM THIS NURSE FOUND A NICE VEIN AND
PATIENT STATES I'M NOT TO TOUCH IT UNTIL HEAT HAS BEEN APPLIED.  PATIENT
REFUSES IV UNTIL SON PRESENTS TO ROOM. AS THIS NURSE WAS LEAVING ROOM PT SON
WAS WALKING DOWN CRUM.  THIS NURSE OBTAINED PATIENT CONSENT FOR BROCH
PROCEDURE 11/9.  PATIENT IS NOW LAYING IN BED EATING 2 NOVELTY PIES AND
DRINKING SPRITE.  PATIENT DENIES PAIN, NEEDS OR FURTHER CONCERNS. PATIENT
IS ENCOURAGED TO USE CALL LIGHT FOR ANY NEEDS OR CONCERNS; PATIENT STATES
UNDERSTANDING.  THIS NURSE EXPLAINS TO PATIENT SHE WILL RETURN TO START IV
AFTER MEETING THE REST OF HER PATIENTS FOR THE EVENING.  PATIENT STATES
UNDERSTANDING.
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
## 2022-11-08 NOTE — NUR
REPORT RECEIVED FROM DELORIS MIRAMONTES; PATIENT CURRENTLY RESTING IN BED WITH
BIPAP ON AT 35% AND VITAL SIGNS WITHIN NORMAL LIMITS. PATIENT ON NO FLUIDS AND
HAS A PERIPHERAL LINE IN THE RIGHT AC.

## 2022-11-08 NOTE — NUR
SW met with patient to complete intake. Patient reports that he lives at home
with his son Freddie ([phone]) in [place]. Patient states that at
home he is somewhat independent, but Freddie helps him with a lot of his ADL's
and makes is close so the patient doesn't fall. Patient states that he has an
electric wheelchair on the first floor of the home, but his bedroom is on the
second floor. PCP is Dr. Laurel Singleton and he utilizes [facility] pharmacy in 
for prescriptions. Patient reports that he does not have a DPOA-HC
established. Form and education provided. Patient reports that he is in the
process of working on completing a DNR and getting his financial affairs in
order.
Patient denies having any home health services currently established.
 
Discharge plan: Home; pending PT/OT rec's

## 2022-11-09 VITALS — TEMPERATURE: 98 F | HEART RATE: 99 BPM | DIASTOLIC BLOOD PRESSURE: 75 MMHG | SYSTOLIC BLOOD PRESSURE: 146 MMHG

## 2022-11-09 VITALS — HEART RATE: 71 BPM | TEMPERATURE: 97.6 F | SYSTOLIC BLOOD PRESSURE: 123 MMHG | DIASTOLIC BLOOD PRESSURE: 62 MMHG

## 2022-11-09 VITALS — HEART RATE: 89 BPM | SYSTOLIC BLOOD PRESSURE: 126 MMHG | DIASTOLIC BLOOD PRESSURE: 87 MMHG

## 2022-11-09 VITALS — SYSTOLIC BLOOD PRESSURE: 113 MMHG | DIASTOLIC BLOOD PRESSURE: 54 MMHG | HEART RATE: 70 BPM

## 2022-11-09 VITALS — HEART RATE: 86 BPM | SYSTOLIC BLOOD PRESSURE: 116 MMHG | DIASTOLIC BLOOD PRESSURE: 65 MMHG

## 2022-11-09 VITALS — DIASTOLIC BLOOD PRESSURE: 73 MMHG | SYSTOLIC BLOOD PRESSURE: 121 MMHG | HEART RATE: 82 BPM

## 2022-11-09 VITALS — DIASTOLIC BLOOD PRESSURE: 44 MMHG | SYSTOLIC BLOOD PRESSURE: 103 MMHG | HEART RATE: 80 BPM

## 2022-11-09 VITALS — HEART RATE: 64 BPM | SYSTOLIC BLOOD PRESSURE: 108 MMHG | DIASTOLIC BLOOD PRESSURE: 56 MMHG

## 2022-11-09 VITALS — HEART RATE: 81 BPM | TEMPERATURE: 97.9 F | SYSTOLIC BLOOD PRESSURE: 126 MMHG | DIASTOLIC BLOOD PRESSURE: 87 MMHG

## 2022-11-09 VITALS — HEART RATE: 86 BPM | TEMPERATURE: 97.2 F | SYSTOLIC BLOOD PRESSURE: 117 MMHG | DIASTOLIC BLOOD PRESSURE: 59 MMHG

## 2022-11-09 VITALS — SYSTOLIC BLOOD PRESSURE: 157 MMHG | HEART RATE: 102 BPM | DIASTOLIC BLOOD PRESSURE: 60 MMHG | TEMPERATURE: 97.3 F

## 2022-11-09 VITALS — DIASTOLIC BLOOD PRESSURE: 83 MMHG | TEMPERATURE: 97.6 F | SYSTOLIC BLOOD PRESSURE: 112 MMHG | HEART RATE: 80 BPM

## 2022-11-09 VITALS — SYSTOLIC BLOOD PRESSURE: 112 MMHG | DIASTOLIC BLOOD PRESSURE: 54 MMHG | HEART RATE: 67 BPM

## 2022-11-09 LAB
ANION GAP SERPL CALC-SCNC: 8 MMOL/L (ref 7–16)
ANISOCYTOSIS BLD QL: (no result)
BUN SERPL-MCNC: 32 MG/DL (ref 8–26)
CALCIUM SERPL-MCNC: 8.9 MG/DL (ref 8.4–10.2)
CHLORIDE SERPL-SCNC: 99 MMOL/L (ref 98–107)
CO2 SERPL-SCNC: 35 MMOL/L (ref 23–31)
CREAT SERPL-SCNC: 0.91 MG/DL (ref 0.72–1.25)
ERYTHROCYTE [DISTWIDTH] IN BLOOD BY AUTOMATED COUNT: 18.9 % (ref 11.5–14.5)
GLUCOSE SERPL-MCNC: 109 MG/DL (ref 70–99)
HCT VFR BLD AUTO: 24.1 % (ref 42–52)
HGB BLD-MCNC: 7.5 G/DL (ref 13.5–18)
HYPOCHROMIA BLD QL SMEAR: (no result)
LYMPHOCYTES NFR BLD MANUAL: 2 % (ref 20–51)
MAGNESIUM SERPL-MCNC: 1.9 MG/DL (ref 1.6–2.6)
MCH RBC QN AUTO: 26 PG (ref 27–31)
MCHC RBC AUTO-ENTMCNC: 31 G/DL (ref 33–37)
MCV RBC AUTO: 84 FL (ref 80–100)
MONOCYTES NFR BLD: 2 % (ref 1.7–9.3)
NEUTS BAND NFR BLD: 3 % (ref 0–10)
NEUTS SEG NFR BLD MANUAL: 93 % (ref 42–75.2)
PLATELET # BLD AUTO: 150 K/MM3 (ref 130–400)
PLATELET BLD QL SMEAR: NORMAL
PMV BLD AUTO: 10.7 FL (ref 7.4–10.4)
POTASSIUM SERPL-SCNC: 5 MMOL/L (ref 3.5–4.5)
RBC # BLD AUTO: 2.88 M/MM3 (ref 4.2–5.6)
SODIUM SERPL-SCNC: 142 MMOL/L (ref 136–145)

## 2022-11-09 PROCEDURE — 0BD68ZX EXTRACTION OF RIGHT LOWER LOBE BRONCHUS, VIA NATURAL OR ARTIFICIAL OPENING ENDOSCOPIC, DIAGNOSTIC: ICD-10-PCS | Performed by: INTERNAL MEDICINE

## 2022-11-09 PROCEDURE — 0BDK8ZX EXTRACTION OF RIGHT LUNG, VIA NATURAL OR ARTIFICIAL OPENING ENDOSCOPIC, DIAGNOSTIC: ICD-10-PCS | Performed by: INTERNAL MEDICINE

## 2022-11-09 NOTE — NUR
PER DR. SAPP, AFTER REVIEWING WITH DR. MARTIN, PATIENT, AND PATIENT'S FAMILY,
PATIENT WILL BE DISCHARGING THIS AFTERNOON. PATIENT TO GO HOME ON HOME O2 AT
6L VIA HFNC. PER SON, PATIENT USES A WHEELCHAIR AND SON WILL ASSIST PATIENT IN
GETTING HOME.

## 2022-11-09 NOTE — NUR
PER DR. MARTIN, PATIENT TO CONTINUE ON BIPAP FOR 1 HOUR AND IS TO REMAIN NPO
UNTIL THAT TIME. ONCE BIPAP IS REMOVED, PATIENT CAN PROGRESS BACK TO HIS
PREVIOUS DIET AND RECEIVE PO MEDICATION. PATIENT AND RT UPDATED.

## 2022-11-09 NOTE — NUR
PATIENT REQUIRING 6L O2 VIA NC PER RT. PATIENT'S RESPIRATORY RATE IS BETWEEN
20-30. DR. MARTIN AND DR. SAPP UPDATED. PER PT, PATIENT HAS 13 STEPS AT HOME HE
WILL NEED TO GO UP IN ORDER TO GET INTO HIS HOME. PER PT, PATIENT OXYGEN
SATURATION DROPPED TO 80% WITH FOUR STEPS OF AMBULATION. DR. SAPP UPDATED.

## 2022-11-09 NOTE — NUR
The patient is adamant about leaving today. An exercise oximetry was ordered
and the patient is needing 5-6 liters. The RN expressed concerns about the
patients sats dropping with ambulation and how is has 13 flights of stairs at
home. JENNIFER contacted the patient's son, Freddie, to address these concerns and
review discharge plan. He states that he is aware that the patient is going to
need a high flow concentrator and portable oxygen tanks. He states that he has
already been in contact with the patient's DME company, [employer], and
plans to pick this equipment up. He states that he is able to assist the
patient at home. He inquired about getting a trilogy. JENNIFER discussed this with
the hospitalist. The hospitalist spoke to Freddie and the plan is for the
patient to follow up with the pulmonologist as outpatient for the trilogy. JENNIFER
contacted and faxed the new exercise oximetry and order for the high flow
concentrator to [facility] [employer]
 
The patient is to discharge back home with his son today, 11/9. No additional
needs at this time.

## 2022-11-09 NOTE — NUR
SHIFT ASSESSMENT COMPLETED. MORNING MEDICATIONS HELD DUE TO NPO STATUS FOR
BRONCHOSCOPY. PATIENT IS ALERT AND ORIENTED X4. LUNGS DIMINSHED IN THE BASES,
PATIENT IS TACHYPNEIC ON EXERTION. LR HUNG ON STRAIGHT TUBING PER ORDER PRIOR
TO PROCEDURE. REPORT GIVEN TO ENDO RNALINA. PATIENT'S BELONGINGS LEFT IN
ROOM. PATIENT ESCORTED DOWN TO ENDO BY RN AND RT.

## 2022-11-09 NOTE — NUR
PATIENT RETURNED FROM [facility] AT 0935 ACCOMPANIED BY RT AND ENDO RN. PATIENT IS
AWAKE AND ALERT WITH BIPAP IN PLACE. VITAL SIGNS STABLE FOR PATIENT. POST OP
CHECKS INITIATED. DENIES PAIN. CALL LIGHT WITHIN REACH.

## 2022-11-09 NOTE — NUR
PATIENT DISCHARGED PER ORDER. EDUCATION PROVIDED ON DISCHARGE MEDICATIONS,
DIAGNOSIS, AND FOLLOW UPS PER ORDER. SON AT BEDSIDE FOR DISCHARGE EDUCATION.
ALL QUESTIONS ANSWERED, PATIENT AND SON DENY FURTHER QUESTIONS. PATIENT SENT
HOME ON O2 AT 6L VIA NC. O2 SATURATION PRIOR TO DISCHARGE WAS 92%. HS DOSE OF
ANTIBIOTICS GIVEN TO PATIENT AS PHARMACY IS CLOSED. PATIENT AND FAMILY DENY
FURTHER NEEDS AT THIS TIME. ESCORTED OUT BY VIA [facility] STAFF. all other ROS negative except as per HPI

## 2022-11-09 NOTE — NUR
PATIENT OFF BIPAP AND ON 6L O2 VIA NC. CURRENT RESPIRATORY RATE IS 22. PATIENT
GIVEN SMALL SIP OF WATER TO EVALUATE GAG REFLEX, NO COUGHING NOTED. PO MEDS
GIVEN PER ORDER, DIET CHANGED BACK TO AHA. DENIES NEEDS AT THIS TIME. CALL
LIGHT WITHIN REACH.

## 2024-11-30 NOTE — NUR
THIS RN, RT, AND CNA ATTEMPTED TO GET PATIENT UP OUT OF BED. PATIENT HELPED TO
SIDE OF THE BED BY STAFF AND WAS NOTED TO DESAT TO 80% ON 5L O2 VIA HFNC. UPON
AMBULATION, PATIENT'S RESPIRATORY RATE INCREASED TO 48. PATIENT ASSISTED BACK
TO BED BY STAFF. CURRENT O2 SATURATION IS 92% ON 5L OF O2 VIA HFNC. PATIENT IS
REQUESTING TO GO HOME. DR. SAPP UPDATED REGARING PATIENT'S EX OX RESULTS AND
DESIRE TO GO HOME. PER DR. SAPP, PATIENT IS NOT SAFE FOR DISCHARGE AT THIS
TIME. THIS RN WILL EDUCATE PATIENT REGARDING AMA DISCHARGE PROCESS. 90